# Patient Record
Sex: MALE | Race: WHITE | NOT HISPANIC OR LATINO | Employment: FULL TIME | URBAN - METROPOLITAN AREA
[De-identification: names, ages, dates, MRNs, and addresses within clinical notes are randomized per-mention and may not be internally consistent; named-entity substitution may affect disease eponyms.]

---

## 2023-01-23 PROBLEM — L21.9 SEBORRHEIC DERMATITIS: Status: ACTIVE | Noted: 2023-01-23

## 2023-01-23 PROBLEM — R07.0 THROAT DISCOMFORT: Status: ACTIVE | Noted: 2023-01-23

## 2023-01-23 PROBLEM — R10.30 INGUINAL PAIN: Status: ACTIVE | Noted: 2023-01-23

## 2023-01-23 PROBLEM — D22.9 NUMEROUS SKIN MOLES: Status: ACTIVE | Noted: 2023-01-23

## 2023-01-23 PROBLEM — H60.90 OTITIS EXTERNA: Status: ACTIVE | Noted: 2023-01-23

## 2023-01-23 PROBLEM — H81.10 BPPV (BENIGN PAROXYSMAL POSITIONAL VERTIGO): Status: ACTIVE | Noted: 2023-01-23

## 2023-01-23 PROBLEM — R31.9 HEMATURIA, UNSPECIFIED: Status: ACTIVE | Noted: 2023-01-23

## 2023-01-23 PROBLEM — H61.23 BILATERAL IMPACTED CERUMEN: Status: ACTIVE | Noted: 2023-01-23

## 2023-01-23 PROBLEM — J02.9 SORE THROAT: Status: ACTIVE | Noted: 2023-01-23

## 2023-01-23 PROBLEM — L98.9 SCALP LESION: Status: ACTIVE | Noted: 2023-01-23

## 2023-01-23 RX ORDER — OMEPRAZOLE 40 MG/1
40 CAPSULE, DELAYED RELEASE ORAL
COMMUNITY
Start: 2022-03-04 | End: 2023-03-13 | Stop reason: SDUPTHER

## 2023-03-13 ENCOUNTER — OFFICE VISIT (OUTPATIENT)
Dept: PRIMARY CARE | Facility: CLINIC | Age: 58
End: 2023-03-13
Payer: COMMERCIAL

## 2023-03-13 VITALS
BODY MASS INDEX: 22.67 KG/M2 | OXYGEN SATURATION: 98 % | SYSTOLIC BLOOD PRESSURE: 118 MMHG | DIASTOLIC BLOOD PRESSURE: 68 MMHG | WEIGHT: 158 LBS | RESPIRATION RATE: 18 BRPM | TEMPERATURE: 97.3 F | HEART RATE: 86 BPM

## 2023-03-13 DIAGNOSIS — Z00.00 ANNUAL PHYSICAL EXAM: Primary | ICD-10-CM

## 2023-03-13 DIAGNOSIS — K21.9 GASTROESOPHAGEAL REFLUX DISEASE WITHOUT ESOPHAGITIS: ICD-10-CM

## 2023-03-13 DIAGNOSIS — N52.9 DIFFICULTY ATTAINING ERECTION: ICD-10-CM

## 2023-03-13 PROCEDURE — 99396 PREV VISIT EST AGE 40-64: CPT | Performed by: INTERNAL MEDICINE

## 2023-03-13 PROCEDURE — 99213 OFFICE O/P EST LOW 20 MIN: CPT | Performed by: INTERNAL MEDICINE

## 2023-03-13 PROCEDURE — 1036F TOBACCO NON-USER: CPT | Performed by: INTERNAL MEDICINE

## 2023-03-13 RX ORDER — MECLIZINE HYDROCHLORIDE 25 MG/1
TABLET ORAL
COMMUNITY
Start: 2020-12-22

## 2023-03-13 RX ORDER — OMEPRAZOLE 40 MG/1
40 CAPSULE, DELAYED RELEASE ORAL
Qty: 90 CAPSULE | Refills: 3 | Status: SHIPPED | OUTPATIENT
Start: 2023-03-13

## 2023-03-13 ASSESSMENT — PAIN SCALES - GENERAL: PAINLEVEL: 0-NO PAIN

## 2023-03-13 ASSESSMENT — PATIENT HEALTH QUESTIONNAIRE - PHQ9
1. LITTLE INTEREST OR PLEASURE IN DOING THINGS: NOT AT ALL
SUM OF ALL RESPONSES TO PHQ9 QUESTIONS 1 AND 2: 0
2. FEELING DOWN, DEPRESSED OR HOPELESS: NOT AT ALL

## 2023-03-13 NOTE — PATIENT INSTRUCTIONS
Plan   Scan EKG copies from his work  Started on new med for stomach issue. Questions related to it answered  Continue regular exercise   Patient  was advised to call back if symptoms persist after few days or worsen.   Patient agreed with plan and felt satisfied.  Follow up in 1 yr or as needed.

## 2023-03-13 NOTE — PROGRESS NOTES
Chief Complaint   Patient presents with    Annual Exam     Patient c/o acid reflux, questions about minor heart arrhythmias       Pt is here for yearly physical     Lately getting more GERD, was on omeprazole in past, now not using it .  Patient denies any shortness of breath, PND, orthopnea, chest pain , palpitation, syncope or edema in legs  patient denies any ausea, vomiting, change in bowel habits or blood in stool.  Patient denies any weakness in extremities.. Denies any headache, visual symptoms , speech problems or  tremors . No TIA or stroke like symptoms..    Not on any rx meds  Lives alone   Non smoking , almost never drinks   REVIEW OF SYSTEMS:   All other systems have been reviewed and are negative in relation to patient's complaint and other than what is mentioned in History of present illness.  Not sexually active  Works as  at [employer]    Pt had EKG in Dec 2022 at work and showed sinus arrythmia, questions answered  Pt added later on about erection issue for a while, wanted to know causes, discussed with him. PSA ok recently   Does regular exercise without any cp, sob  Had blood tests , ekg from work place in Dec 2022. Reviewed .   Objective   /68 (BP Location: Left arm, Patient Position: Sitting)   Pulse 86   Temp 36.3 °C (97.3 °F)   Resp 18   Wt 71.7 kg (158 lb)   SpO2 98%   BMI 22.67 kg/m²     Vitals noted   Not in acute distress  Conj Pink, No icterus  Neck:No Cervical LN enlargement, No Thyroid enlargement No carotid bruit  Lungs: good air entry bilaterally, no rales or rhonchi  CVS: S1 S2 + , no S3. No loud heart murmur heard.   Abdomen: Soft, non tender , BS +. no organomegaly , no CVA tenderness  MSK: No spine tenderness or muscle tenderness noted on gross examination  CNS: Pt is alert, moving all 4 extremities. no motor weakness or abnormal movements noted on gross examination.  Extremities: No edema, No calf tenderness, Zeinab's sign negative.  DTR + both knees , romberg's  neg      Assessment:  1. Annual physical exam - reviewed EKG and blood tests results.    2. Gastroesophageal reflux disease without esophagitis -started on med.    3 unsustained erection- testosterone ordered  Plan   Scan EKG copies from his work  Started on new med for stomach issue. Questions related to it answered  Continue regular exercise   Patient  was advised to call back if symptoms persist after few days or worsen.   Patient agreed with plan and felt satisfied.  Follow up in 1 yr or as needed.

## 2023-03-22 ENCOUNTER — LAB (OUTPATIENT)
Dept: LAB | Facility: LAB | Age: 58
End: 2023-03-22
Payer: COMMERCIAL

## 2023-03-22 DIAGNOSIS — N52.9 DIFFICULTY ATTAINING ERECTION: ICD-10-CM

## 2023-03-22 PROCEDURE — 36415 COLL VENOUS BLD VENIPUNCTURE: CPT

## 2023-03-22 PROCEDURE — 84403 ASSAY OF TOTAL TESTOSTERONE: CPT

## 2023-03-22 PROCEDURE — 84402 ASSAY OF FREE TESTOSTERONE: CPT

## 2023-03-27 ENCOUNTER — TELEPHONE (OUTPATIENT)
Dept: PRIMARY CARE | Facility: CLINIC | Age: 58
End: 2023-03-27
Payer: COMMERCIAL

## 2023-03-27 LAB
TESTOSTERONE FREE (CHAN): 118.5 PG/ML (ref 35–155)
TESTOSTERONE,TOTAL,LC-MS/MS: 529 NG/DL (ref 250–1100)

## 2023-03-27 NOTE — TELEPHONE ENCOUNTER
----- Message from Rickey Gibbons MD sent at 3/27/2023  2:30 PM EDT -----  I have reviewed your recent blood test (testosterone)  ordered by me and there were no significant abnormality noted.   Please notify patient. Thanks.    ----- Message -----  From: Lab, Background User  Sent: 3/27/2023   1:16 PM EDT  To: Rickey Gibbons MD

## 2025-02-25 ENCOUNTER — APPOINTMENT (OUTPATIENT)
Dept: PRIMARY CARE | Facility: CLINIC | Age: 60
End: 2025-02-25
Payer: COMMERCIAL

## 2025-02-25 VITALS
DIASTOLIC BLOOD PRESSURE: 69 MMHG | HEART RATE: 73 BPM | OXYGEN SATURATION: 96 % | BODY MASS INDEX: 22.05 KG/M2 | HEIGHT: 70 IN | SYSTOLIC BLOOD PRESSURE: 105 MMHG | TEMPERATURE: 98.4 F | WEIGHT: 154 LBS

## 2025-02-25 DIAGNOSIS — Z12.11 COLON CANCER SCREENING: ICD-10-CM

## 2025-02-25 DIAGNOSIS — Z13.6 SCREENING FOR CARDIOVASCULAR CONDITION: ICD-10-CM

## 2025-02-25 DIAGNOSIS — Z00.00 ANNUAL PHYSICAL EXAM: Primary | ICD-10-CM

## 2025-02-25 DIAGNOSIS — K21.9 GASTROESOPHAGEAL REFLUX DISEASE WITHOUT ESOPHAGITIS: ICD-10-CM

## 2025-02-25 DIAGNOSIS — Z87.898 HX OF VERTIGO: ICD-10-CM

## 2025-02-25 DIAGNOSIS — Z12.5 SCREENING PSA (PROSTATE SPECIFIC ANTIGEN): ICD-10-CM

## 2025-02-25 DIAGNOSIS — L98.9 FACIAL LESION: ICD-10-CM

## 2025-02-25 DIAGNOSIS — Z13.220 SCREENING FOR LIPID DISORDERS: ICD-10-CM

## 2025-02-25 PROCEDURE — 99396 PREV VISIT EST AGE 40-64: CPT | Performed by: INTERNAL MEDICINE

## 2025-02-25 PROCEDURE — 1036F TOBACCO NON-USER: CPT | Performed by: INTERNAL MEDICINE

## 2025-02-25 PROCEDURE — G0446 INTENS BEHAVE THER CARDIO DX: HCPCS | Performed by: INTERNAL MEDICINE

## 2025-02-25 PROCEDURE — 3008F BODY MASS INDEX DOCD: CPT | Performed by: INTERNAL MEDICINE

## 2025-02-25 PROCEDURE — 90750 HZV VACC RECOMBINANT IM: CPT | Performed by: INTERNAL MEDICINE

## 2025-02-25 PROCEDURE — 90471 IMMUNIZATION ADMIN: CPT | Performed by: INTERNAL MEDICINE

## 2025-02-25 PROCEDURE — 99213 OFFICE O/P EST LOW 20 MIN: CPT | Performed by: INTERNAL MEDICINE

## 2025-02-25 ASSESSMENT — ENCOUNTER SYMPTOMS
DEPRESSION: 0
LOSS OF SENSATION IN FEET: 0
OCCASIONAL FEELINGS OF UNSTEADINESS: 0

## 2025-02-25 ASSESSMENT — PATIENT HEALTH QUESTIONNAIRE - PHQ9
2. FEELING DOWN, DEPRESSED OR HOPELESS: NOT AT ALL
1. LITTLE INTEREST OR PLEASURE IN DOING THINGS: NOT AT ALL
SUM OF ALL RESPONSES TO PHQ9 QUESTIONS 1 AND 2: 0

## 2025-02-25 ASSESSMENT — COLUMBIA-SUICIDE SEVERITY RATING SCALE - C-SSRS
1. IN THE PAST MONTH, HAVE YOU WISHED YOU WERE DEAD OR WISHED YOU COULD GO TO SLEEP AND NOT WAKE UP?: NO
2. HAVE YOU ACTUALLY HAD ANY THOUGHTS OF KILLING YOURSELF?: NO
6. HAVE YOU EVER DONE ANYTHING, STARTED TO DO ANYTHING, OR PREPARED TO DO ANYTHING TO END YOUR LIFE?: NO

## 2025-02-25 NOTE — PATIENT INSTRUCTIONS
Plan  Annual physical done   Shingrix shot today and 2nd dose in May by nurse   Blood tests ordered  Regular exercise to be continued   Ref to dermatology done .   F/U 1 yr for physical .

## 2025-02-25 NOTE — PROGRESS NOTES
"My nurse note reviewed. Patient is here for:  Annual Exam (Pt is here for annual exam )       Pt lives alone. Works at [employer]   Here for physical and follow up  Not taking GERD med anymore as symptoms are much better  Patient denies any shortness of breath, PND, orthopnea, chest pain , palpitation, syncope or edema in legs  patient denies any abdominal pain, tenderness, nausea, vomiting, change in bowel habits or blood in stool.  Patient denies any weakness in extremities.. Denies any headache, visual symptoms , speech problems or  tremors . No TIA or stroke like symptoms..    Non smoker, very seldom drinks   Wanted to get facial lesion checked , has been there for > 2 yrs, somewhat bigger and crustier now.   REVIEW OF SYSTEMS:   All other systems have been reviewed and are negative in relation to patient's complaint and other than what is mentioned in History of present illness.  OBJECTIVE :  There is a 1.5 X 1 cm rough lesion on R side of forehead ? Seborrhic keratosis .   OBJECTIVE :    /69   Pulse 73   Temp 36.9 °C (98.4 °F)   Ht 1.778 m (5' 10\")   Wt 69.9 kg (154 lb)   SpO2 96%   BMI 22.10 kg/m²   Vitals noted  Not in acute distress  Conj Pink, No icterus  ears exam normal  Neck:No Cervical LN enlargement, No Thyroid enlargement No carotid bruit  Lungs: good air entry bilaterally, no rales or rhonchi  CVS: S1 S2 + , no S3. No loud heart murmur heard.   Abdomen: Soft, non tender , BS +. no organomegaly , no CVA tenderness  MSK: No spine tenderness or muscle tenderness noted on gross examination  CNS: Pt is alert, moving all 4 extremities. no motor weakness or abnormal movements noted on gross examination.  Extremities: No edema, No calf tenderness, Zeinab's sign negative. DTR + knee and wrists, Rhomberg's neg    During office visit today patient's chronic diagnosis were reviewed and questions related to it answered to patient's satisfaction . Risk factors for heart, stroke were discussed with patient . " Discussion about preventative tests were done with patient also . pain issue was discussed as appropriate for patient . I plan to follow up patient's chronic medical conditions as appropriate.   Ascvd risk score is 6.7 %  Assessment:  1. Annual physical exam  CBC and Auto Differential    Basic Metabolic Panel    Hepatic Function Panel    Thyroid Stimulating Hormone    CBC and Auto Differential    Basic Metabolic Panel    Hepatic Function Panel    Thyroid Stimulating Hormone      2. Gastroesophageal reflux disease without esophagitis  controlled  Not on PPI any more . Occ uses tums      3. Hx of vertigo  Not for a while now. Not on med      4. Facial lesion  Thyroid Stimulating Hormone    Referral to Dermatology    Thyroid Stimulating Hormone      5. Colon cancer screening  Cologuard® colon cancer screening    Cologuard® colon cancer screening      6. Screening PSA (prostate specific antigen)  Prostate Specific Antigen    Prostate Specific Antigen      7. Screening for lipid disorders  Lipid Panel Non-Fasting    Lipid Panel Non-Fasting      8 screening for cardiovascular risk - discussed borderline risk.  Plan  Annual physical done   Shingrix shot today and 2nd dose in May by nurse   Blood tests ordered  Regular exercise to be continued   Ref to dermatology done .   F/U 1 yr for physical .     ADDM: after shingle shot , pt was sweaty and light headed. I made him lie down on table. Bp sys 98 mm of HG. He was alert, oriented. Said that in past he had similar symptoms with blood drawing too. Told that he should let provider or technician know about his this history so we can be more aware and careful about his vasovagal response. He agreed. After 10 min of rest , drinking water, eating donut , he was fine. Bp was 118 sys. He walked out of office fine.

## 2025-03-04 LAB
ALBUMIN SERPL-MCNC: 4.3 G/DL (ref 3.6–5.1)
ALBUMIN/GLOB SERPL: 1.7 (CALC) (ref 1–2.5)
ALP SERPL-CCNC: 90 U/L (ref 35–144)
ALT SERPL-CCNC: 25 U/L (ref 9–46)
ANION GAP SERPL CALCULATED.4IONS-SCNC: 10 MMOL/L (CALC) (ref 7–17)
AST SERPL-CCNC: 23 U/L (ref 10–35)
BASOPHILS # BLD AUTO: 39 CELLS/UL (ref 0–200)
BASOPHILS NFR BLD AUTO: 0.7 %
BILIRUB DIRECT SERPL-MCNC: 0.1 MG/DL
BILIRUB INDIRECT SERPL-MCNC: 0.8 MG/DL (CALC) (ref 0.2–1.2)
BILIRUB SERPL-MCNC: 0.9 MG/DL (ref 0.2–1.2)
BUN SERPL-MCNC: 15 MG/DL (ref 7–25)
BUN/CREAT SERPL: NORMAL (CALC) (ref 6–22)
CALCIUM SERPL-MCNC: 9.3 MG/DL (ref 8.6–10.3)
CHLORIDE SERPL-SCNC: 104 MMOL/L (ref 98–110)
CHOLEST SERPL-MCNC: 196 MG/DL
CHOLEST/HDLC SERPL: 4.8 (CALC)
CO2 SERPL-SCNC: 28 MMOL/L (ref 20–32)
CREAT SERPL-MCNC: 1.19 MG/DL (ref 0.7–1.3)
EGFRCR SERPLBLD CKD-EPI 2021: 71 ML/MIN/1.73M2
EOSINOPHIL # BLD AUTO: 143 CELLS/UL (ref 15–500)
EOSINOPHIL NFR BLD AUTO: 2.6 %
ERYTHROCYTE [DISTWIDTH] IN BLOOD BY AUTOMATED COUNT: 12.1 % (ref 11–15)
GLOBULIN SER CALC-MCNC: 2.5 G/DL (CALC) (ref 1.9–3.7)
GLUCOSE SERPL-MCNC: 90 MG/DL (ref 65–139)
HCT VFR BLD AUTO: 48.8 % (ref 38.5–50)
HDLC SERPL-MCNC: 41 MG/DL
HGB BLD-MCNC: 17 G/DL (ref 13.2–17.1)
LDLC SERPL CALC-MCNC: 131 MG/DL (CALC)
LYMPHOCYTES # BLD AUTO: 1716 CELLS/UL (ref 850–3900)
LYMPHOCYTES NFR BLD AUTO: 31.2 %
MCH RBC QN AUTO: 32.8 PG (ref 27–33)
MCHC RBC AUTO-ENTMCNC: 34.8 G/DL (ref 32–36)
MCV RBC AUTO: 94 FL (ref 80–100)
MONOCYTES # BLD AUTO: 446 CELLS/UL (ref 200–950)
MONOCYTES NFR BLD AUTO: 8.1 %
NEUTROPHILS # BLD AUTO: 3157 CELLS/UL (ref 1500–7800)
NEUTROPHILS NFR BLD AUTO: 57.4 %
NONHDLC SERPL-MCNC: 155 MG/DL (CALC)
PLATELET # BLD AUTO: 181 THOUSAND/UL (ref 140–400)
PMV BLD REES-ECKER: 10.4 FL (ref 7.5–12.5)
POTASSIUM SERPL-SCNC: 4.4 MMOL/L (ref 3.5–5.3)
PROT SERPL-MCNC: 6.8 G/DL (ref 6.1–8.1)
PSA SERPL-MCNC: 2.26 NG/ML
RBC # BLD AUTO: 5.19 MILLION/UL (ref 4.2–5.8)
SODIUM SERPL-SCNC: 142 MMOL/L (ref 135–146)
TRIGL SERPL-MCNC: 126 MG/DL
TSH SERPL-ACNC: 3.03 MIU/L (ref 0.4–4.5)
WBC # BLD AUTO: 5.5 THOUSAND/UL (ref 3.8–10.8)

## 2025-03-07 LAB — NONINV COLON CA DNA+OCC BLD SCRN STL QL: NEGATIVE

## 2025-03-11 ENCOUNTER — TELEPHONE (OUTPATIENT)
Dept: PRIMARY CARE | Facility: CLINIC | Age: 60
End: 2025-03-11
Payer: COMMERCIAL

## 2025-03-11 NOTE — TELEPHONE ENCOUNTER
----- Message from Rickey Gibbons sent at 3/7/2025  4:56 PM EST -----  I have reviewed your recent blood tests ordered by me and there were no significant abnormality noted. CHOLESTEROL .    Please notify patient. Thanks.

## 2025-04-08 ENCOUNTER — APPOINTMENT (OUTPATIENT)
Dept: DERMATOLOGY | Facility: CLINIC | Age: 60
End: 2025-04-08
Payer: COMMERCIAL

## 2025-04-08 DIAGNOSIS — D48.5 NEOPLASM OF UNCERTAIN BEHAVIOR OF SKIN: ICD-10-CM

## 2025-04-08 DIAGNOSIS — D22.5 MELANOCYTIC NEVI OF TRUNK: ICD-10-CM

## 2025-04-08 DIAGNOSIS — Z12.83 ENCOUNTER FOR SCREENING FOR MALIGNANT NEOPLASM OF SKIN: ICD-10-CM

## 2025-04-08 DIAGNOSIS — L57.8 PHOTOAGING OF SKIN: ICD-10-CM

## 2025-04-08 DIAGNOSIS — D18.01 HEMANGIOMA OF SKIN: ICD-10-CM

## 2025-04-08 DIAGNOSIS — L72.0 EPITHELIAL INCLUSION CYST: ICD-10-CM

## 2025-04-08 DIAGNOSIS — Z85.828 PERSONAL HISTORY OF OTHER MALIGNANT NEOPLASM OF SKIN: ICD-10-CM

## 2025-04-08 DIAGNOSIS — L73.8 SEBACEOUS HYPERPLASIA OF FACE: ICD-10-CM

## 2025-04-08 DIAGNOSIS — L82.1 SEBORRHEIC KERATOSIS: Primary | ICD-10-CM

## 2025-04-08 PROCEDURE — 11102 TANGNTL BX SKIN SINGLE LES: CPT | Performed by: DERMATOLOGY

## 2025-04-08 PROCEDURE — 99203 OFFICE O/P NEW LOW 30 MIN: CPT | Performed by: DERMATOLOGY

## 2025-04-08 PROCEDURE — 11103 TANGNTL BX SKIN EA SEP/ADDL: CPT | Performed by: DERMATOLOGY

## 2025-04-08 NOTE — LETTER
April 8, 2025     Rickey Gibbons MD  [address] [address]    Patient: Martin Dumont   YOB: 1965   Date of Visit: 4/8/2025       Dear Dr. Rickey Gibbons MD:    Thank you for referring Martin Dumont to me for evaluation. Below are my notes for this consultation.  If you have questions, please do not hesitate to call me. I look forward to following your patient along with you.       Sincerely,     Ashli Butt, DO      CC: No Recipients  ______________________________________________________________________________________    Subjective    Martin Dumont is a 59 y.o. male who presents for the following: Suspicious Skin Lesion (New patient referral by by Dr. Rickey Gibbons MD who referred patient for concerning lesion on the right temple that is raised and irritated.  Patient has a history of:  BCC - 1998, DN - 09/11/06 . With regards to lesion on the right temple that has been present for apprx 2 years, discolored, scabbing and part of it flaked off, no topicals and no past treatments.) and Skin Check (Waist up skin examination. Reports new lesions on the back of concern. Personal history of dysplastic nevus and basal cell carcinoma.).     Review of Systems:  No other skin or systemic complaints other than what is documented elsewhere in the note.    The following portions of the chart were reviewed this encounter and updated as appropriate:          Skin Cancer History  History of BCC (1998)      Specialty Problems          Dermatology Problems    Numerous skin moles    Scalp lesion    Seborrheic dermatitis        Objective  Well appearing patient in no apparent distress; mood and affect are within normal limits.    A waist up examination was performed including scalp, face, eyes, ears, nose, lips, neck, chest, axillae, abdomen, back, bilateral upper extremities,  hands, fingers, fingernails. All findings within normal limits unless otherwise noted  below.      Assessment/Plan  1. Seborrheic keratosis  Brown, tan waxy macules and stuck on appearing papules and plaques    The benign nature of these skin lesions reviewed, reassure provided and no further treatment needed at this time.   These lesions can be removed, if symptomatic (itching, bleeding, rubbing on clothing, painful), otherwise removal is considered cosmetic.     2. Neoplasm of uncertain behavior of skin (2)  Right Temple  1cm keratotic crusted plaque    Lesion biopsy  Type of biopsy: tangential    Informed consent: discussed and consent obtained    Timeout: patient name, date of birth, surgical site, and procedure verified    Procedure prep:  Patient was prepped and draped  Anesthesia: the lesion was anesthetized in a standard fashion    Anesthetic:  1% lidocaine w/ epinephrine 1-100,000 local infiltration  Instrument used: DermaBlade    Hemostasis achieved with: aluminum chloride    Outcome: patient tolerated procedure well    Post-procedure details: sterile dressing applied and wound care instructions given    Dressing type: petrolatum and bandage      Staff Communication: Dermatology Local Anesthesia: 1 % Lidocaine / Epinephrine - Amount:1cc    Specimen 1 - Dermatopathology- DERM LAB  Differential Diagnosis: ISK vs. SCC  Check Margins Yes/No?:    Comments:    Dermpath Lab: Routine Histopathology (formalin-fixed tissue)    Right Upper Back  5mm irregular brown and tan macule    Lesion biopsy  Type of biopsy: tangential    Informed consent: discussed and consent obtained    Timeout: patient name, date of birth, surgical site, and procedure verified    Procedure prep:  Patient was prepped and draped  Anesthesia: the lesion was anesthetized in a standard fashion    Anesthetic:  1% lidocaine w/ epinephrine 1-100,000 local infiltration  Instrument used: DermaBlade    Hemostasis achieved with: aluminum chloride    Outcome: patient tolerated procedure well    Post-procedure details: sterile dressing  applied and wound care instructions given    Dressing type: petrolatum and bandage      Staff Communication: Dermatology Local Anesthesia: 1 % Lidocaine / Epinephrine - Amount:1cc    Specimen 2 - Dermatopathology- DERM LAB  Differential Diagnosis: atypical nevus   Check Margins Yes/No?:    Comments:    Dermpath Lab: Routine Histopathology (formalin-fixed tissue)    Lesion concerning for ISK vs. SCC (right temple) atypical nevus (right upper back). The need for biopsy for definitive diagnosis recommended. Risks and benefits reviewed, see procedure note.    3. Photoaging of skin  Mottled pigmentation with telangiectasias and brown reticular macules in sun exposed areas of the body.    The risk of chronic, cumulative sun damage and risk of development of skin cancer was reviewed today.   The importance of sun protection was reviewed: including the use of a broad spectrum sunscreen of at least SPF 30 that protects against both UVA/UVB rays, with ingredients such as Zinc oxide or titanium dioxide, wearing sun protective clothing and sun avoidance. We reviewed the warning signs of non-melanoma skin cancer and ABCDEs of melanoma  Please follow up should you notice any new or changing pre-existing skin lesion.    4. Hemangioma of skin  Cherry red papules    The benign nature of these skin lesions were reviewed, no treatment is necessary.   Please follow up for any new or pre-existing lesion that is changing in size, shape, color, becomes painful, tender, itches or bleed.    5. Epithelial inclusion cyst  Left Flank  1 cm subcutaneous, mobile nodule with a central punctum.    Epidermal cysts are benign, encapsulated growths of unknown cause.   These lesions can become enlarged, inflamed, painful and drain.   These lesions can be removed surgically, however lesion is asymptomatic so we agreed to defer at this time.    6. Sebaceous hyperplasia of face  Head - Anterior (Face)  Small yellow, lobulated papules with a central  dell.    Benign nature of these skin lesions were reviewed with the patient. Lesions can be treated, however this is a cosmetic procedure and not covered by insurance.    7. Personal history of other malignant neoplasm of skin  Well healed scar at site of prior treatment without evidence of recurrence.    There is no evidence of recurrence on clinical examination today, reassurance was provided to the patient. The importance of sun protection was reviewed with the patient including the use of a broad spectrum sunscreen that protects against both UVA/UVB rays, with ingredients such as Zinc oxide or titanium dioxide, wearing sun protective clothing and sun avoidance. Warning signs of non-melanoma skin cancer were reviewed. ABCDEs of melanoma reviewed. Patient to f/u should they notice any new or changing pre-existing skin lesion    8. Melanocytic nevi of trunk  Torso - Posterior (Back)  Tan-brown symmetric macules and papules    Clinically benign appearing nevi, no treatment is necessary.  The importance of sun protection was reviewed: including the use of a broad spectrum sunscreen that protects against both UVA/UVB rays, with ingredients such as Zinc oxide or titanium dioxide, wearing sun protective clothing and sun avoidance.   ABCDEs of melanoma reviewed.  Please follow up should you notice any new or changing pre-existing skin lesion.    9. Encounter for screening for malignant neoplasm of skin  2 lesions of concern - right upper back and right temples (see NUB)    The risk of chronic, cumulative sun damage and risk of development of skin cancer was reviewed today.   The importance of sun protection was reviewed: including the use of a broad spectrum sunscreen of at least SPF 30 that protects against both UVA/UVB rays, with ingredients such as Zinc oxide or titanium dioxide, wearing sun protective clothing and sun avoidance. We reviewed the warning signs of non-melanoma skin cancer and ABCDEs of melanoma  Please  follow up should you notice any new or changing pre-existing skin lesion.    Follow up pending biopsy results.

## 2025-04-08 NOTE — PROGRESS NOTES
Subjective     Martin Dumont is a 59 y.o. male who presents for the following: Suspicious Skin Lesion (New patient referral by by Dr. Rickey Gibbons MD who referred patient for concerning lesion on the right temple that is raised and irritated.  Patient has a history of:  BCC - 1998, DN - 09/11/06 . With regards to lesion on the right temple that has been present for apprx 2 years, discolored, scabbing and part of it flaked off, no topicals and no past treatments.) and Skin Check (Waist up skin examination. Reports new lesions on the back of concern. Personal history of dysplastic nevus and basal cell carcinoma.).     Review of Systems:  No other skin or systemic complaints other than what is documented elsewhere in the note.    The following portions of the chart were reviewed this encounter and updated as appropriate:          Skin Cancer History  History of BCC (1998)      Specialty Problems          Dermatology Problems    Numerous skin moles    Scalp lesion    Seborrheic dermatitis        Objective   Well appearing patient in no apparent distress; mood and affect are within normal limits.    A waist up examination was performed including scalp, face, eyes, ears, nose, lips, neck, chest, axillae, abdomen, back, bilateral upper extremities,  hands, fingers, fingernails. All findings within normal limits unless otherwise noted below.      Assessment/Plan   1. Seborrheic keratosis  Brown, tan waxy macules and stuck on appearing papules and plaques    The benign nature of these skin lesions reviewed, reassure provided and no further treatment needed at this time.   These lesions can be removed, if symptomatic (itching, bleeding, rubbing on clothing, painful), otherwise removal is considered cosmetic.     2. Neoplasm of uncertain behavior of skin (2)  Right Temple  1cm keratotic crusted plaque    Lesion biopsy  Type of biopsy: tangential    Informed consent: discussed and consent obtained    Timeout: patient name,  date of birth, surgical site, and procedure verified    Procedure prep:  Patient was prepped and draped  Anesthesia: the lesion was anesthetized in a standard fashion    Anesthetic:  1% lidocaine w/ epinephrine 1-100,000 local infiltration  Instrument used: DermaBlade    Hemostasis achieved with: aluminum chloride    Outcome: patient tolerated procedure well    Post-procedure details: sterile dressing applied and wound care instructions given    Dressing type: petrolatum and bandage      Staff Communication: Dermatology Local Anesthesia: 1 % Lidocaine / Epinephrine - Amount:1cc    Specimen 1 - Dermatopathology- DERM LAB  Differential Diagnosis: ISK vs. SCC  Check Margins Yes/No?:    Comments:    Dermpath Lab: Routine Histopathology (formalin-fixed tissue)    Right Upper Back  5mm irregular brown and tan macule    Lesion biopsy  Type of biopsy: tangential    Informed consent: discussed and consent obtained    Timeout: patient name, date of birth, surgical site, and procedure verified    Procedure prep:  Patient was prepped and draped  Anesthesia: the lesion was anesthetized in a standard fashion    Anesthetic:  1% lidocaine w/ epinephrine 1-100,000 local infiltration  Instrument used: DermaBlade    Hemostasis achieved with: aluminum chloride    Outcome: patient tolerated procedure well    Post-procedure details: sterile dressing applied and wound care instructions given    Dressing type: petrolatum and bandage      Staff Communication: Dermatology Local Anesthesia: 1 % Lidocaine / Epinephrine - Amount:1cc    Specimen 2 - Dermatopathology- DERM LAB  Differential Diagnosis: atypical nevus   Check Margins Yes/No?:    Comments:    Dermpath Lab: Routine Histopathology (formalin-fixed tissue)    Lesion concerning for ISK vs. SCC (right temple) atypical nevus (right upper back). The need for biopsy for definitive diagnosis recommended. Risks and benefits reviewed, see procedure note.    3. Photoaging of skin  Mottled  pigmentation with telangiectasias and brown reticular macules in sun exposed areas of the body.    The risk of chronic, cumulative sun damage and risk of development of skin cancer was reviewed today.   The importance of sun protection was reviewed: including the use of a broad spectrum sunscreen of at least SPF 30 that protects against both UVA/UVB rays, with ingredients such as Zinc oxide or titanium dioxide, wearing sun protective clothing and sun avoidance. We reviewed the warning signs of non-melanoma skin cancer and ABCDEs of melanoma  Please follow up should you notice any new or changing pre-existing skin lesion.    4. Hemangioma of skin  Cherry red papules    The benign nature of these skin lesions were reviewed, no treatment is necessary.   Please follow up for any new or pre-existing lesion that is changing in size, shape, color, becomes painful, tender, itches or bleed.    5. Epithelial inclusion cyst  Left Flank  1 cm subcutaneous, mobile nodule with a central punctum.    Epidermal cysts are benign, encapsulated growths of unknown cause.   These lesions can become enlarged, inflamed, painful and drain.   These lesions can be removed surgically, however lesion is asymptomatic so we agreed to defer at this time.    6. Sebaceous hyperplasia of face  Head - Anterior (Face)  Small yellow, lobulated papules with a central dell.    Benign nature of these skin lesions were reviewed with the patient. Lesions can be treated, however this is a cosmetic procedure and not covered by insurance.    7. Personal history of other malignant neoplasm of skin  Well healed scar at site of prior treatment without evidence of recurrence.    There is no evidence of recurrence on clinical examination today, reassurance was provided to the patient. The importance of sun protection was reviewed with the patient including the use of a broad spectrum sunscreen that protects against both UVA/UVB rays, with ingredients such as Zinc  oxide or titanium dioxide, wearing sun protective clothing and sun avoidance. Warning signs of non-melanoma skin cancer were reviewed. ABCDEs of melanoma reviewed. Patient to f/u should they notice any new or changing pre-existing skin lesion    8. Melanocytic nevi of trunk  Torso - Posterior (Back)  Tan-brown symmetric macules and papules    Clinically benign appearing nevi, no treatment is necessary.  The importance of sun protection was reviewed: including the use of a broad spectrum sunscreen that protects against both UVA/UVB rays, with ingredients such as Zinc oxide or titanium dioxide, wearing sun protective clothing and sun avoidance.   ABCDEs of melanoma reviewed.  Please follow up should you notice any new or changing pre-existing skin lesion.    9. Encounter for screening for malignant neoplasm of skin  2 lesions of concern - right upper back and right temples (see NUB)    The risk of chronic, cumulative sun damage and risk of development of skin cancer was reviewed today.   The importance of sun protection was reviewed: including the use of a broad spectrum sunscreen of at least SPF 30 that protects against both UVA/UVB rays, with ingredients such as Zinc oxide or titanium dioxide, wearing sun protective clothing and sun avoidance. We reviewed the warning signs of non-melanoma skin cancer and ABCDEs of melanoma  Please follow up should you notice any new or changing pre-existing skin lesion.    Follow up pending biopsy results.

## 2025-04-10 LAB
LABORATORY COMMENT REPORT: NORMAL
PATH REPORT.FINAL DX SPEC: NORMAL
PATH REPORT.GROSS SPEC: NORMAL
PATH REPORT.MICROSCOPIC SPEC OTHER STN: NORMAL
PATH REPORT.RELEVANT HX SPEC: NORMAL
PATH REPORT.TOTAL CANCER: NORMAL

## 2025-04-11 NOTE — RESULT ENCOUNTER NOTE
A) SCCis - recommend Mohs vs. 5FU BID x 6 weeks  B) Moderately atypical nevus, on the margin - recommend shave removal vs. Excision  Dysplastic nevi (moles) are atypical moles that clinically and microscopically appear different from benign, common mole. They can be described by a pathologist as mild, moderately or severely atypical.  They are not pre-cancerous.  Treatment of these lesions vary. Mildly atypical moles are typically not treated; moderately atypical moles are observed or at times depending on the pathology report or recurrence noted clinically can be treated with shave or wide local excision to be sure it is adequately removed and there are no other abnormalities in the surrounding tissue.

## 2025-04-17 DIAGNOSIS — D04.30 SQUAMOUS CELL CARCINOMA IN SITU (SCCIS) OF SKIN OF FACE: Primary | ICD-10-CM

## 2025-05-23 ENCOUNTER — APPOINTMENT (OUTPATIENT)
Dept: DERMATOLOGY | Facility: CLINIC | Age: 60
End: 2025-05-23
Payer: COMMERCIAL

## 2025-05-23 VITALS — DIASTOLIC BLOOD PRESSURE: 79 MMHG | HEART RATE: 77 BPM | SYSTOLIC BLOOD PRESSURE: 130 MMHG

## 2025-05-23 DIAGNOSIS — C44.320 SQUAMOUS CELL CARCINOMA OF SKIN OF FACE: ICD-10-CM

## 2025-05-23 NOTE — PROGRESS NOTES
Mohs Surgery Operative Note    Date of Surgery:  5/23/2025  Surgeon:  Max Wilburn MD PhD  Office Location: [address] [address]  Dept: [phone]  Dept Fax: [phone]  Referring Provider: Ashli Butt, [address], [address],  [facility]45      Assessment/Plan   Pre-procedure:   Obtained informed consent: written from patient  The surgical site was identified and confirmed with the patient.     Intra-operative:   Audible time out called at 8:15 AM  05/23/25  by: VANESA NOGUEIRA RN   Verified patient name, birthdate, site, specimen bottle label & requisition.    The planned procedure(s) was again reviewed with the patient. The risks of bleeding, infection, nerve damage and scarring were reviewed. Written authorization was obtained. The patient identity, surgical site, and planned procedure(s) were verified. The provider acted as both surgeon and pathologist.     SQUAMOUS CELL CARCINOMA OF SKIN OF FACE  Right Buddhist  Mohs surgery    Consent obtained: written    Universal Protocol:  Procedure explained and questions answered to patient or proxy's satisfaction: Yes    Test results available and properly labeled: Yes    Pathology report reviewed: Yes    External notes reviewed: Yes    Photo or diagram used for site identification: Yes    Site/side marked: Yes    Slide independently reviewed by Mohs surgeon: Yes    Immediately prior to procedure a time out was called: Yes    Patient identity confirmed: verbally with patient  Preparation: Patient was prepped and draped in usual sterile fashion      Anticoagulation:  Is the patient taking prescription anticoagulant and/or aspirin prescribed/recommended by a physician? No    Was the anticoagulation regimen changed prior to Mohs? No      Anesthesia:  Anesthesia method: local infiltration  Local anesthetic: lidocaine 1% WITH epi    Procedure Details:  Case ID Number:  [id number]  Biopsy accession number: [id number]  Date of biopsy: 4/8/2025  Frozen section biopsy performed: No    Specimen debulked: Yes (SCCis)    Pre-Op diagnosis: squamous cell carcinoma  SCC subtype: in situ  Surgical site (from skin exam): Right [place]  Pre-operative length (cm): 1.2  Pre-operative width (cm): 1  Indications for Mohs surgery: anatomic location where tissue conservation is critical    Micrographic Surgery Details:  Post-operative length (cm): 1.5  Post-operative width (cm): 1.2  Number of Mohs stages: 1    Stage 1     Comments: The patient was brought into the operating room and placed in the procedure chair in the appropriate position.  The area positive by previous biopsy was identified and confirmed with the patient. The area of clinically obvious tumor was debulked using a curette and/or scalpel as needed. An incision was made following the Mohs approach through the skin. The specimen was taken to the lab, divided into 2 piece(s) and appropriately chromacoded and processed.                 Tumor features identified on Mohs section: no tumor identified    Depth of defect: subcutaneous fat    Patient tolerance of procedure: tolerated well, no immediate complications    Reconstruction:  Was the defect reconstructed? Yes    Was reconstruction performed by the same Mohs surgeon? Yes    Setting of reconstruction: outpatient office  When was reconstruction performed? same day  Type of reconstruction: linear  Linear reconstruction: complex  Length of linear repair (cm): 4  Subcutaneous Layers (Deep Stitches)   Suture size:  5-0  Suture type:  Vicryl  Stitches:  Buried vertical mattress  Fine/surface layer approximation (top stitches)   Epidermal/Superficial suture size:  5-0  Epidermal/Superficial suture type:  Fast-absorbing gut  Stitches: simple running    Hemostasis achieved with: electrodesiccation  Outcome: patient tolerated procedure well with no complications    Post-procedure details: sterile  dressing applied and wound care instructions given    Dressing type: pressure dressing, petrolatum, Telfa pad and Hypafix      Complex Linear Repair - Wide Undermining:  Given the location and size of the defect, it was determined that a complex layered linear closure was required to restore normal anatomy and function. The repair was considered complex because extensive undermining was required and performed. The amount of undermining performed was greater than the maximum width of the defect as measured perpendicular to the closure line along at least one entire edge of the defect. Standing cutaneous cones were removed using Burow's triangles. The wound edges were brought into close approximation with buried vertical mattress sutures. The remainder of the wound was then closed with epidermal top sutures.              The final repair measured 4.0 cm    Wound care was discussed, and the patient was given written post-operative wound care instructions.      The patient will follow up with Max Wilburn MD PhD as needed for any post operative problems or concerns, and will follow up with their primary dermatologist as scheduled.       Claire NELSON RN, BSN am scribing for, and in the presence of Max Wilburn MD PhD    Max NELSON MD, PhD, personally performed the services described in the documentation as scribed by Claire Patino RN, BSN in my presence, and confirm it is both accurate and complete.

## 2025-05-23 NOTE — PROGRESS NOTES
Office Visit Note  Date: 5/23/2025  Surgeon:  Max Wilburn MD PhD  Office Location: [address] [address]  Dept: [phone]  Dept Fax: [phone]  Referring Provider: Ashli Butt, [address], [address],  [facility]45    Subjective   Martin Dumont is a 59 y.o. male who presents for the following: MOHS Surgery (Right Zoroastrian, SCC)    According to the patient, the lesion has been present for approximately greater than 1 year at the time of diagnosis.  The lesion is not causing symptoms.  The lesion has not been treated previously.    The patient does not have a pacemaker / defibrillator.  The patient does not have a heart valve / joint replacement.    The patient is not on blood thinners.  The patient does not have a history of hepatitis B or C.  The patient does not have a history of HIV.  The patient does not have a history of immunosuppression (e.g. organ transplantation, malignancy, medications)    Review of Systems:  No other skin or systemic complaints other than what is documented elsewhere in the note.    MEDICAL HISTORY: clinically relevant history including significant past medical history, medications and allergies was reviewed and documented in Epic.    Objective   Well appearing patient in no apparent distress; mood and affect are within normal limits.  Vital signs: See record.  Noted on the   Right [place]  Is a 1.2 x 1.0 cm scar      The patient confirmed the identified site.    Discussion:  The nature of the diagnosis was explained. The lesion is a skin cancer.  It has a risk of local growth and distant spread. The condition is associated with sun exposure.  Warning signs of non-melanoma skin cancer discussed. Patient was instructed to perform monthly self skin examination.  We recommended that the patient have regular full skin exams given an increased risk of subsequent skin cancers. The patient was  instructed to use sun protective behaviors including use of broad spectrum sunscreens and sun protective clothing to reduce risk of skin cancers.      Risks, benefits, side effects of Mohs surgery were discussed with patient and the patient voiced understanding.  It was explained that even though the cure rate of Mohs is very high it is not 100%. Risks of surgery including but not limited to bleeding, infection, numbness, nerve damage, and scar were reviewed.  Discussion included wound care requirements, activity restrictions, likely scar outcome and time to heal.     After Mohs surgery, the defect may need to be repaired surgically and the scar may be longer than the original lesion.  Reconstruction options, risks, and benefits were reviewed including second intention healing, linear repair (4-1 ratio was explained), local flaps, skin grafts, cartilage grafts and interpolation flaps (the need for multiple surgeries was explained). Possible outcomes were reviewed including likely scar appearance, failure of flap survival, infection, bleeding and the need for revision surgery.     The pathology was reviewed, the photograph was reviewed, and the referring physician's note was reviewed.    Patient elected for Mohs surgery.     IClaire RN, BSN am scribing for, and in the presence of Max Wilburn MD PhD    Max NELSON MD, PhD, personally performed the services described in the documentation as scribed by Claire Patino, RN, BSN in my presence, and confirm it is both accurate and complete.

## 2025-05-27 ENCOUNTER — APPOINTMENT (OUTPATIENT)
Dept: PRIMARY CARE | Facility: CLINIC | Age: 60
End: 2025-05-27
Payer: COMMERCIAL

## 2025-05-27 VITALS — HEIGHT: 70 IN | BODY MASS INDEX: 22.05 KG/M2 | WEIGHT: 154 LBS

## 2025-05-27 DIAGNOSIS — Z23 NEED FOR SHINGLES VACCINE: ICD-10-CM

## 2025-05-27 PROCEDURE — 90750 HZV VACC RECOMBINANT IM: CPT | Performed by: INTERNAL MEDICINE

## 2025-05-27 PROCEDURE — 90471 IMMUNIZATION ADMIN: CPT | Performed by: INTERNAL MEDICINE

## 2025-05-27 PROCEDURE — 3008F BODY MASS INDEX DOCD: CPT | Performed by: INTERNAL MEDICINE

## 2025-05-27 ASSESSMENT — PAIN SCALES - GENERAL: PAINLEVEL_OUTOF10: 0-NO PAIN

## 2025-06-06 ENCOUNTER — APPOINTMENT (OUTPATIENT)
Dept: DERMATOLOGY | Facility: CLINIC | Age: 60
End: 2025-06-06
Payer: COMMERCIAL

## 2025-06-06 DIAGNOSIS — D48.5 NEOPLASM OF UNCERTAIN BEHAVIOR OF SKIN: Primary | ICD-10-CM

## 2025-06-06 PROCEDURE — 11302 SHAVE SKIN LESION 1.1-2.0 CM: CPT | Performed by: STUDENT IN AN ORGANIZED HEALTH CARE EDUCATION/TRAINING PROGRAM

## 2025-06-06 NOTE — Clinical Note
Biopsy proven moderately atypical nevus on the right upper back.     Dysplastic nevi (moles) are atypical moles that clinically and microscopically appear different from benign, common mole. They can be described by a pathologist as mild, moderately or severely atypical.  They are not pre-cancerous.  Treatment of these lesions vary. Mildly atypical moles are typically not treated; moderately atypical moles are observed or at times depending on the pathology report or recurrence noted clinically can be treated with shave or wide local excision to be sure it is adequately removed and there are no other abnormalities in the surrounding tissue.  Severely atypical moles are nearly always treated with a excision.  Having multiple atypical moles are a marker of risk of potentially developing melanoma somewhere on the body at some point during a patient's lifetime.   If you have had atypical moles you should be seen semi-annually or annually for full body skin examinations.  Full body photography of your moles may be recommended.  Most importantly the need for sun protection is extremely important.  Being sure to wear a broad spectrum sunscreen that protects against both UVA + UVB rays of at least SPF 30 daily on sun exposed skin, when outside for more than 2 hours be sure to reapply. Sun protective (UPF) clothing is beneficial and a broad brimmed hat rather than a ball cap is often more effective. The combination of sunscreen, sun avoidance and sun protective clothing is best.    Discussed wide local excision vs. Shave excision and risks, benefits of each. Patient elected for shave excision today.     Re-shave/shave excision with 0.3 mm margins performed today. Patient tolerated the procedure well.

## 2025-06-06 NOTE — Clinical Note
Dysplastic nevi (moles) are atypical moles that clinically and microscopically appear different from benign, common mole. They can be described by a pathologist as mild, moderately or severely atypical.  They are not pre-cancerous.  Treatment of these lesions vary. Mildly atypical moles are typically not treated; moderately atypical moles are observed or at times depending on the pathology report or recurrence noted clinically can be treated with shave or wide local excision to be sure it is adequately removed and there are no other abnormalities in the surrounding tissue.  Severely atypical moles are nearly always treated with a excision.  Having multiple atypical moles are a marker of risk of potentially developing melanoma somewhere on the body at some point during a patient's lifetime.   If you have had atypical moles you should be seen semi-annually or annually for full body skin examinations.  Full body photography of your moles may be recommended.  Most importantly the need for sun protection is extremely important.  Being sure to wear a broad spectrum sunscreen that protects against both UVA + UVB rays of at least SPF 30 daily on sun exposed skin, when outside for more than 2 hours be sure to reapply. Sun protective (UPF) clothing is beneficial and a broad brimmed hat rather than a ball cap is often more effective. The combination of sunscreen, sun avoidance and sun protective clothing is best.

## 2025-06-06 NOTE — PROGRESS NOTES
Subjective     Martin Dumont is a 59 y.o. male who presents for the following: Procedure (Re-shave of Moderately DN of the Right Upper Back).     Review of Systems:  No other skin or systemic complaints other than what is documented elsewhere in the note.    The following portions of the chart were reviewed this encounter and updated as appropriate:          Skin Cancer History  Biopsy Log Book  Biopsied Type Location Status   4/8/25 SCC in Situ Right [place] Treatment Complete - Mohs  6/6/25 4/8/25 Atypical Nevus - Moderate Right Upper Back Treatment Complete - Excision  6/6/25       Additional History      Specialty Problems          Dermatology Problems    Numerous skin moles    Scalp lesion    Seborrheic dermatitis        Objective   Well appearing patient in no apparent distress; mood and affect are within normal limits.    A focused skin examination was performed. All findings within normal limits unless otherwise noted below.    Right Upper Back  Pink atrophic scar of previous biopsy site       Assessment/Plan   NEOPLASM OF UNCERTAIN BEHAVIOR OF SKIN  Right Upper Back  Shave removal    Lesion length (cm):  1  Lesion width (cm):  0.7  Margin per side (cm):  0.3  Lesion diameter (cm):  1.3  Informed consent: discussed and consent obtained    Timeout: patient name, date of birth, surgical site, and procedure verified    Procedure prep:  Patient was prepped and draped  Anesthesia: the lesion was anesthetized in a standard fashion    Anesthetic:  1% lidocaine w/ epinephrine 1-100,000 local infiltration  Instrument used: DermaBlade    Hemostasis achieved with: aluminum chloride    Outcome: patient tolerated procedure well    Post-procedure details: sterile dressing applied and wound care instructions given    Dressing type: bandage and petrolatum    Specimen 1 - Dermatopathology- DERM LAB  Differential Diagnosis: biopsy proven moderately atypical compound nevus, re-shave   Check Margins Yes/No?:    Comments:     Dermpath Lab: Routine Histopathology (formalin-fixed tissue)  Biopsy proven moderately atypical nevus on the right upper back.     Dysplastic nevi (moles) are atypical moles that clinically and microscopically appear different from benign, common mole. They can be described by a pathologist as mild, moderately or severely atypical.  They are not pre-cancerous.  Treatment of these lesions vary. Mildly atypical moles are typically not treated; moderately atypical moles are observed or at times depending on the pathology report or recurrence noted clinically can be treated with shave or wide local excision to be sure it is adequately removed and there are no other abnormalities in the surrounding tissue.  Severely atypical moles are nearly always treated with a excision.  Having multiple atypical moles are a marker of risk of potentially developing melanoma somewhere on the body at some point during a patient's lifetime.   If you have had atypical moles you should be seen semi-annually or annually for full body skin examinations.  Full body photography of your moles may be recommended.  Most importantly the need for sun protection is extremely important.  Being sure to wear a broad spectrum sunscreen that protects against both UVA + UVB rays of at least SPF 30 daily on sun exposed skin, when outside for more than 2 hours be sure to reapply. Sun protective (UPF) clothing is beneficial and a broad brimmed hat rather than a ball cap is often more effective. The combination of sunscreen, sun avoidance and sun protective clothing is best.    Discussed wide local excision vs. Shave excision and risks, benefits of each. Patient elected for shave excision today.     Re-shave/shave excision with 0.3 mm margins performed today. Patient tolerated the procedure well.     Follow up in 6 months for FBSE    My DO Suri, PGY-4  Department of Dermatology    I was present for the entirety of the procedure(s).  I performed the  procedure. I saw and evaluated the patient. I personally obtained the key and critical portions of the history and physical exam or was physically present for key and critical portions performed by the resident/fellow. I reviewed the resident/fellow's documentation and discussed the patient with the resident/fellow. I agree with the resident/fellow's medical decision making as documented in the note.    Ashli Butt, DO

## 2025-12-12 ENCOUNTER — APPOINTMENT (OUTPATIENT)
Dept: DERMATOLOGY | Facility: CLINIC | Age: 60
End: 2025-12-12
Payer: COMMERCIAL